# Patient Record
Sex: MALE | Race: WHITE | NOT HISPANIC OR LATINO | Employment: FULL TIME | URBAN - NONMETROPOLITAN AREA
[De-identification: names, ages, dates, MRNs, and addresses within clinical notes are randomized per-mention and may not be internally consistent; named-entity substitution may affect disease eponyms.]

---

## 2021-05-08 ENCOUNTER — APPOINTMENT (EMERGENCY)
Dept: RADIOLOGY | Facility: HOSPITAL | Age: 26
End: 2021-05-08
Payer: COMMERCIAL

## 2021-05-08 ENCOUNTER — HOSPITAL ENCOUNTER (EMERGENCY)
Facility: HOSPITAL | Age: 26
Discharge: HOME | End: 2021-05-08
Attending: EMERGENCY MEDICINE | Admitting: EMERGENCY MEDICINE
Payer: COMMERCIAL

## 2021-05-08 VITALS
SYSTOLIC BLOOD PRESSURE: 133 MMHG | WEIGHT: 292.55 LBS | RESPIRATION RATE: 20 BRPM | TEMPERATURE: 98.3 F | OXYGEN SATURATION: 98 % | DIASTOLIC BLOOD PRESSURE: 76 MMHG | HEART RATE: 76 BPM

## 2021-05-08 DIAGNOSIS — J45.901 EXACERBATION OF ASTHMA, UNSPECIFIED ASTHMA SEVERITY, UNSPECIFIED WHETHER PERSISTENT: Primary | ICD-10-CM

## 2021-05-08 LAB
ALBUMIN SERPL BCP-MCNC: 4.2 G/DL (ref 3.5–5)
ALP SERPL-CCNC: 60 U/L (ref 46–116)
ALT SERPL W P-5'-P-CCNC: 73 U/L (ref 12–78)
ANION GAP SERPL CALCULATED.3IONS-SCNC: 5 MMOL/L (ref 4–13)
AST SERPL W P-5'-P-CCNC: 30 U/L (ref 5–45)
BASOPHILS # BLD AUTO: 0.08 THOUSANDS/ΜL (ref 0–0.1)
BASOPHILS NFR BLD AUTO: 1 % (ref 0–1)
BILIRUB SERPL-MCNC: 0.86 MG/DL (ref 0.2–1)
BUN SERPL-MCNC: 13 MG/DL (ref 5–25)
CALCIUM SERPL-MCNC: 8.8 MG/DL (ref 8.3–10.1)
CHLORIDE SERPL-SCNC: 104 MMOL/L (ref 100–108)
CO2 SERPL-SCNC: 30 MMOL/L (ref 21–32)
CREAT SERPL-MCNC: 1.14 MG/DL (ref 0.6–1.3)
EOSINOPHIL # BLD AUTO: 0.66 THOUSAND/ΜL (ref 0–0.61)
EOSINOPHIL NFR BLD AUTO: 10 % (ref 0–6)
ERYTHROCYTE [DISTWIDTH] IN BLOOD BY AUTOMATED COUNT: 12.3 % (ref 11.6–15.1)
GFR SERPL CREATININE-BSD FRML MDRD: 88 ML/MIN/1.73SQ M
GLUCOSE SERPL-MCNC: 101 MG/DL (ref 65–140)
HCT VFR BLD AUTO: 45.6 % (ref 36.5–49.3)
HGB BLD-MCNC: 15.7 G/DL (ref 12–17)
IMM GRANULOCYTES # BLD AUTO: 0.02 THOUSAND/UL (ref 0–0.2)
IMM GRANULOCYTES NFR BLD AUTO: 0 % (ref 0–2)
LYMPHOCYTES # BLD AUTO: 2.65 THOUSANDS/ΜL (ref 0.6–4.47)
LYMPHOCYTES NFR BLD AUTO: 42 % (ref 14–44)
MCH RBC QN AUTO: 31 PG (ref 26.8–34.3)
MCHC RBC AUTO-ENTMCNC: 34.4 G/DL (ref 31.4–37.4)
MCV RBC AUTO: 90 FL (ref 82–98)
MONOCYTES # BLD AUTO: 0.35 THOUSAND/ΜL (ref 0.17–1.22)
MONOCYTES NFR BLD AUTO: 6 % (ref 4–12)
NEUTROPHILS # BLD AUTO: 2.62 THOUSANDS/ΜL (ref 1.85–7.62)
NEUTS SEG NFR BLD AUTO: 41 % (ref 43–75)
NRBC BLD AUTO-RTO: 0 /100 WBCS
PLATELET # BLD AUTO: 200 THOUSANDS/UL (ref 149–390)
PMV BLD AUTO: 9.9 FL (ref 8.9–12.7)
POTASSIUM SERPL-SCNC: 4.1 MMOL/L (ref 3.5–5.3)
PROT SERPL-MCNC: 7.6 G/DL (ref 6.4–8.2)
RBC # BLD AUTO: 5.07 MILLION/UL (ref 3.88–5.62)
SARS-COV-2 RNA RESP QL NAA+PROBE: NEGATIVE
SODIUM SERPL-SCNC: 139 MMOL/L (ref 136–145)
WBC # BLD AUTO: 6.38 THOUSAND/UL (ref 4.31–10.16)

## 2021-05-08 PROCEDURE — 71045 X-RAY EXAM CHEST 1 VIEW: CPT

## 2021-05-08 PROCEDURE — 96374 THER/PROPH/DIAG INJ IV PUSH: CPT

## 2021-05-08 PROCEDURE — 80053 COMPREHEN METABOLIC PANEL: CPT | Performed by: EMERGENCY MEDICINE

## 2021-05-08 PROCEDURE — U0003 INFECTIOUS AGENT DETECTION BY NUCLEIC ACID (DNA OR RNA); SEVERE ACUTE RESPIRATORY SYNDROME CORONAVIRUS 2 (SARS-COV-2) (CORONAVIRUS DISEASE [COVID-19]), AMPLIFIED PROBE TECHNIQUE, MAKING USE OF HIGH THROUGHPUT TECHNOLOGIES AS DESCRIBED BY CMS-2020-01-R: HCPCS | Performed by: EMERGENCY MEDICINE

## 2021-05-08 PROCEDURE — 94640 AIRWAY INHALATION TREATMENT: CPT

## 2021-05-08 PROCEDURE — 99285 EMERGENCY DEPT VISIT HI MDM: CPT

## 2021-05-08 PROCEDURE — 99285 EMERGENCY DEPT VISIT HI MDM: CPT | Performed by: EMERGENCY MEDICINE

## 2021-05-08 PROCEDURE — 85025 COMPLETE CBC W/AUTO DIFF WBC: CPT | Performed by: EMERGENCY MEDICINE

## 2021-05-08 PROCEDURE — 36415 COLL VENOUS BLD VENIPUNCTURE: CPT | Performed by: EMERGENCY MEDICINE

## 2021-05-08 PROCEDURE — U0005 INFEC AGEN DETEC AMPLI PROBE: HCPCS | Performed by: EMERGENCY MEDICINE

## 2021-05-08 RX ORDER — IPRATROPIUM BROMIDE AND ALBUTEROL SULFATE 2.5; .5 MG/3ML; MG/3ML
3 SOLUTION RESPIRATORY (INHALATION) ONCE
Status: COMPLETED | OUTPATIENT
Start: 2021-05-08 | End: 2021-05-08

## 2021-05-08 RX ORDER — ALBUTEROL SULFATE 90 UG/1
2 AEROSOL, METERED RESPIRATORY (INHALATION) ONCE
Status: COMPLETED | OUTPATIENT
Start: 2021-05-08 | End: 2021-05-08

## 2021-05-08 RX ORDER — METHYLPREDNISOLONE SODIUM SUCCINATE 125 MG/2ML
125 INJECTION, POWDER, LYOPHILIZED, FOR SOLUTION INTRAMUSCULAR; INTRAVENOUS ONCE
Status: COMPLETED | OUTPATIENT
Start: 2021-05-08 | End: 2021-05-08

## 2021-05-08 RX ADMIN — ALBUTEROL SULFATE 2 PUFF: 90 AEROSOL, METERED RESPIRATORY (INHALATION) at 02:43

## 2021-05-08 RX ADMIN — IPRATROPIUM BROMIDE AND ALBUTEROL SULFATE 3 ML: 2.5; .5 SOLUTION RESPIRATORY (INHALATION) at 01:23

## 2021-05-08 RX ADMIN — METHYLPREDNISOLONE SODIUM SUCCINATE 125 MG: 125 INJECTION, POWDER, FOR SOLUTION INTRAMUSCULAR; INTRAVENOUS at 01:23

## 2021-05-08 NOTE — ED PROVIDER NOTES
History  Chief Complaint   Patient presents with    Breathing Difficulty     patient reports difficulty breathing for 3 days  clear mucous with cough  Three days of coughing shortness of breath and wheezing  No fevers or chills  No chest pain  No nausea or vomiting  No body aches  No loss of taste or smell  No other COVID symptoms  No other complaints  Patient states he occasionally has similar symptoms to this in the [place] with asthmatic type symptoms such as wheezing cough      History provided by:  Patient   used: No    Cough  Cough characteristics:  Non-productive  Sputum characteristics:  Clear  Onset quality:  Gradual  Duration:  3 days  Timing:  Constant  Progression:  Unchanged  Chronicity:  New  Relieved by:  Nothing  Worsened by:  Nothing  Ineffective treatments:  None tried  Associated symptoms: shortness of breath and wheezing    Associated symptoms: no chest pain, no chills, no diaphoresis, no ear pain, no eye discharge, no fever, no headaches, no myalgias, no rash, no rhinorrhea, no sinus congestion and no sore throat        None       History reviewed  No pertinent past medical history  History reviewed  No pertinent surgical history  History reviewed  No pertinent family history  I have reviewed and agree with the history as documented  E-Cigarette/Vaping    E-Cigarette Use Never User      E-Cigarette/Vaping Substances    Nicotine No     THC No     CBD No     Flavoring No     Other No     Unknown No      Social History     Tobacco Use    Smoking status: Never Smoker    Smokeless tobacco: Never Used   Substance Use Topics    Alcohol use: Yes     Frequency: 2-3 times a week    Drug use: Not Currently       Review of Systems   Constitutional: Negative for chills, diaphoresis and fever  HENT: Negative for ear pain, hearing loss, rhinorrhea, sore throat, trouble swallowing and voice change  Eyes: Negative for pain and discharge     Respiratory: Positive for cough, shortness of breath and wheezing  Cardiovascular: Negative for chest pain and palpitations  Gastrointestinal: Negative for abdominal pain, blood in stool, constipation, diarrhea, nausea and vomiting  Genitourinary: Negative for dysuria, flank pain, frequency and hematuria  Musculoskeletal: Negative for joint swelling, myalgias, neck pain and neck stiffness  Skin: Negative for rash and wound  Neurological: Negative for dizziness, seizures, syncope, facial asymmetry and headaches  Psychiatric/Behavioral: Negative for hallucinations, self-injury and suicidal ideas  All other systems reviewed and are negative  Physical Exam  Physical Exam  Vitals signs and nursing note reviewed  Constitutional:       General: He is not in acute distress  Appearance: He is well-developed  HENT:      Head: Normocephalic and atraumatic  Right Ear: External ear normal       Left Ear: External ear normal    Eyes:      Conjunctiva/sclera: Conjunctivae normal       Pupils: Pupils are equal, round, and reactive to light  Neck:      Musculoskeletal: Normal range of motion and neck supple  Cardiovascular:      Rate and Rhythm: Normal rate and regular rhythm  Heart sounds: Normal heart sounds  No murmur  Pulmonary:      Effort: Pulmonary effort is normal       Breath sounds: Wheezing (End-expiratory) present  No rales  Abdominal:      General: Bowel sounds are normal  There is no distension  Palpations: Abdomen is soft  Tenderness: There is no abdominal tenderness  There is no guarding or rebound  Musculoskeletal: Normal range of motion  General: No deformity  Skin:     General: Skin is warm and dry  Findings: No rash  Neurological:      General: No focal deficit present  Mental Status: He is alert and oriented to person, place, and time  Cranial Nerves: No cranial nerve deficit     Psychiatric:         Behavior: Behavior normal          Vital Signs  ED Triage Vitals [05/08/21 0115]   Temperature Pulse Respirations Blood Pressure SpO2   97 9 °F (36 6 °C) 69 19 148/95 96 %      Temp Source Heart Rate Source Patient Position - Orthostatic VS BP Location FiO2 (%)   Temporal -- Lying Left arm --      Pain Score       No Pain           Vitals:    05/08/21 0115   BP: 148/95   Pulse: 69   Patient Position - Orthostatic VS: Lying         Visual Acuity      ED Medications  Medications   albuterol (PROVENTIL HFA,VENTOLIN HFA) inhaler 2 puff (has no administration in time range)   ipratropium-albuterol (DUO-NEB) 0 5-2 5 mg/3 mL inhalation solution 3 mL (3 mL Nebulization Given 5/8/21 0123)   methylPREDNISolone sodium succinate (Solu-MEDROL) injection 125 mg (125 mg Intravenous Given 5/8/21 0123)       Diagnostic Studies  Results Reviewed     Procedure Component Value Units Date/Time    Novel Coronavirus [facility] [[id number]]  (Normal) Collected: 05/08/21 0123    Lab Status: Final result Specimen: Nares from Nasopharyngeal Swab Updated: 05/08/21 0234     SARS-CoV-2 Negative    Narrative: The specimen collection materials, transport medium, and/or testing methodology utilized in the production of these test results have been proven to be reliable in a limited validation with an abbreviated program under the Emergency Utilization Authorization provided by the FDA  Testing reported as "Presumptive positive" will be confirmed with secondary testing to ensure result accuracy  Clinical caution and judgement should be used with the interpretation of these results with consideration of the clinical impression and other laboratory testing  Testing reported as "Positive" or "Negative" has been proven to be accurate according to standard laboratory validation requirements  All testing is performed with control materials showing appropriate reactivity at standard intervals        Comprehensive metabolic panel [[id number]] Collected: 05/08/21 0122    Lab Status: Final result Specimen: Blood from Arm, Right Updated: 05/08/21 0154     Sodium 139 mmol/L      Potassium 4 1 mmol/L      Chloride 104 mmol/L      CO2 30 mmol/L      ANION GAP 5 mmol/L      BUN 13 mg/dL      Creatinine 1 14 mg/dL      Glucose 101 mg/dL      Calcium 8 8 mg/dL      AST 30 U/L      ALT 73 U/L      Alkaline Phosphatase 60 U/L      Total Protein 7 6 g/dL      Albumin 4 2 g/dL      Total Bilirubin 0 86 mg/dL      eGFR 88 ml/min/1 73sq m     Narrative:      [place] guidelines for Chronic Kidney Disease (CKD):     Stage 1 with normal or high GFR (GFR > 90 mL/min/1 73 square meters)    Stage 2 Mild CKD (GFR = 60-89 mL/min/1 73 square meters)    Stage 3A Moderate CKD (GFR = 45-59 mL/min/1 73 square meters)    Stage 3B Moderate CKD (GFR = 30-44 mL/min/1 73 square meters)    Stage 4 Severe CKD (GFR = 15-29 mL/min/1 73 square meters)    Stage 5 End Stage CKD (GFR <15 mL/min/1 73 square meters)  Note: GFR calculation is accurate only with a steady state creatinine    CBC and differential [[id number]]  (Abnormal) Collected: 05/08/21 0122    Lab Status: Final result Specimen: Blood from Arm, Right Updated: 05/08/21 0134     WBC 6 38 Thousand/uL      RBC 5 07 Million/uL      Hemoglobin 15 7 g/dL      Hematocrit 45 6 %      MCV 90 fL      MCH 31 0 pg      MCHC 34 4 g/dL      RDW 12 3 %      MPV 9 9 fL      Platelets 286 Thousands/uL      nRBC 0 /100 WBCs      Neutrophils Relative 41 %      Immat GRANS % 0 %      Lymphocytes Relative 42 %      Monocytes Relative 6 %      Eosinophils Relative 10 %      Basophils Relative 1 %      Neutrophils Absolute 2 62 Thousands/µL      Immature Grans Absolute 0 02 Thousand/uL      Lymphocytes Absolute 2 65 Thousands/µL      Monocytes Absolute 0 35 Thousand/µL      Eosinophils Absolute 0 66 Thousand/µL      Basophils Absolute 0 08 Thousands/µL                  XR chest portable   ED Interpretation by Deng Box MD (05/08 0908)   Normal chest Procedures  Procedures         ED Course  ED Course as of May 08 0241   Sat May 08, 2021   1254 COVID testing negative, chest x-ray negative, patient improved after nebulization, appropriate for discharge  8609 Patient reexamined and wheezing resolved  [facility]  Number of Diagnoses or Management Options     Amount and/or Complexity of Data Reviewed  Clinical lab tests: ordered and reviewed  Tests in the radiology section of CPT®: ordered and reviewed  Decide to obtain previous medical records or to obtain history from someone other than the patient: yes  Review and summarize past medical records: yes  Independent visualization of images, tracings, or specimens: yes        Disposition  Final diagnoses:   Exacerbation of asthma, unspecified asthma severity, unspecified whether persistent     Time reflects when diagnosis was documented in both MDM as applicable and the Disposition within this note     Time User Action Codes Description Comment    5/8/2021  2:41 AM Hartman December Add [J45 [id number]] Exacerbation of asthma, unspecified asthma severity, unspecified whether persistent       ED Disposition     ED Disposition Condition Date/Time Comment    Discharge Stable Sat May 8, 2021  2:40 AM Kerry Pool discharge to home/self care  Follow-up Information     Follow up With Specialties Details Why [facility], DO Family Medicine  As needed [address]  [address]  [address] [facility] [address]  [phone]            Patient's Medications    No medications on file     No discharge procedures on file      PDMP Review     None          ED Provider  Electronically Signed by           Tracy Staples MD  05/08/21 9265

## 2021-05-17 ENCOUNTER — OFFICE VISIT (OUTPATIENT)
Dept: FAMILY MEDICINE CLINIC | Facility: CLINIC | Age: 26
End: 2021-05-17
Payer: COMMERCIAL

## 2021-05-17 VITALS
DIASTOLIC BLOOD PRESSURE: 84 MMHG | HEART RATE: 68 BPM | WEIGHT: 291 LBS | RESPIRATION RATE: 20 BRPM | TEMPERATURE: 97.5 F | BODY MASS INDEX: 36.18 KG/M2 | SYSTOLIC BLOOD PRESSURE: 154 MMHG | HEIGHT: 75 IN

## 2021-05-17 DIAGNOSIS — E55.9 VITAMIN D DEFICIENCY: ICD-10-CM

## 2021-05-17 DIAGNOSIS — J45.909 MILD ASTHMA WITHOUT COMPLICATION, UNSPECIFIED WHETHER PERSISTENT: Primary | ICD-10-CM

## 2021-05-17 DIAGNOSIS — E66.01 CLASS 2 SEVERE OBESITY DUE TO EXCESS CALORIES WITH SERIOUS COMORBIDITY AND BODY MASS INDEX (BMI) OF 36.0 TO 36.9 IN ADULT (HCC): ICD-10-CM

## 2021-05-17 PROCEDURE — 99203 OFFICE O/P NEW LOW 30 MIN: CPT | Performed by: FAMILY MEDICINE

## 2021-05-17 PROCEDURE — 1036F TOBACCO NON-USER: CPT | Performed by: FAMILY MEDICINE

## 2021-05-17 PROCEDURE — 3008F BODY MASS INDEX DOCD: CPT | Performed by: FAMILY MEDICINE

## 2021-05-17 PROCEDURE — 3725F SCREEN DEPRESSION PERFORMED: CPT | Performed by: FAMILY MEDICINE

## 2021-05-17 RX ORDER — METHYLPREDNISOLONE 4 MG/1
TABLET ORAL
Qty: 21 EACH | Refills: 0 | Status: SHIPPED | OUTPATIENT
Start: 2021-05-17

## 2021-05-17 RX ORDER — ALBUTEROL SULFATE 90 UG/1
2 AEROSOL, METERED RESPIRATORY (INHALATION) EVERY 6 HOURS PRN
COMMUNITY
End: 2021-05-17 | Stop reason: SDUPTHER

## 2021-05-17 RX ORDER — AZITHROMYCIN 250 MG/1
TABLET, FILM COATED ORAL
Qty: 6 TABLET | Refills: 0 | Status: SHIPPED | OUTPATIENT
Start: 2021-05-17 | End: 2021-05-22

## 2021-05-17 RX ORDER — DEXTROMETHORPHAN HYDROBROMIDE AND PROMETHAZINE HYDROCHLORIDE 15; 6.25 MG/5ML; MG/5ML
5 SOLUTION ORAL 4 TIMES DAILY PRN
Qty: 180 ML | Refills: 1 | Status: SHIPPED | OUTPATIENT
Start: 2021-05-17

## 2021-05-17 RX ORDER — ALBUTEROL SULFATE 90 UG/1
2 AEROSOL, METERED RESPIRATORY (INHALATION) EVERY 6 HOURS PRN
Qty: 18 G | Refills: 5 | Status: SHIPPED | OUTPATIENT
Start: 2021-05-17

## 2021-05-17 NOTE — PROGRESS NOTES
Assessment/Plan: mild persistent asthma of  New onset  Currently on albuterol and Mucinex with continued symptoms  The plan is to do a pulmonary function test   [facility] [place] allergy panel and mycoplasma titer  We will renew the Proventil inhaler add Medrol Z-J Luis and promethazine DM  This is the 1st asthmatic attack the patient has had and he has never smoked  Hypertension with a blood pressure 154/84 will observe the blood pressure  Will be obtaining baseline laboratory and recheck the patient after lower laboratory and testing is completed    Class 2 obesity diet has been discussed    Problem List Items Addressed This Visit     None      Visit Diagnoses     Mild asthma without complication, unspecified whether persistent    -  Primary    Relevant Medications    methylPREDNISolone 4 MG tablet therapy pack    azithromycin (Zithromax) 250 mg tablet    Promethazine-DM (PHENERGAN-DM) 6 25-15 mg/5 mL oral syrup    albuterol (PROVENTIL HFA,VENTOLIN HFA) 90 mcg/act inhaler    Other Relevant Orders    Complete PFT with post bronchodilator    [facility] Allergy Panel, Adult    Mycoplasma Pneumoniae AB, IgG/IgM    CBC and differential    Comprehensive metabolic panel           Diagnoses and all orders for this visit:    Mild asthma without complication, unspecified whether persistent  -     Complete PFT with post bronchodilator; Future  -     [facility] Allergy Panel, Adult; Future  -     methylPREDNISolone 4 MG tablet therapy pack; Use as directed on package  -     azithromycin (Zithromax) 250 mg tablet; Take 2 tablets (500 mg total) by mouth daily for 1 day, THEN 1 tablet (250 mg total) daily for 4 days  -     Mycoplasma Pneumoniae AB, IgG/IgM; Future  -     CBC and differential; Future  -     Comprehensive metabolic panel; Future  -     Promethazine-DM (PHENERGAN-DM) 6 25-15 mg/5 mL oral syrup;  Take 5 mL by mouth 4 (four) times a day as needed for cough  -     albuterol (PROVENTIL HFA,VENTOLIN HFA) 90 mcg/act inhaler; Inhale 2 puffs every 6 (six) hours as needed for wheezing    Other orders  -     Discontinue: albuterol (PROVENTIL HFA,VENTOLIN HFA) 90 mcg/act inhaler; Inhale 2 puffs every 6 (six) hours as needed for wheezing        No problem-specific Assessment & Plan notes found for this encounter  PHQ-9 Depression Screening    PHQ-9:   Frequency of the following problems over the past two weeks:      Little interest or pleasure in doing things: 0 - not at all  Feeling down, depressed, or hopeless: 0 - not at all  PHQ-2 Score: 0          Body mass index is 36 37 kg/m²  BMI Counseling: Body mass index is 36 37 kg/m²  The BMI is above normal  Nutrition recommendations include reducing portion sizes, decreasing overall calorie intake and 3-5 servings of fruits/vegetables daily  Subjective:      Patient ID: Mayra Weiss is a 31 y o  male  Patient presents after being seen at the emergency department may 8 with an asthmatic attack the patient was treated with steroids and given albuterol inhaler   Patient presents because he is still symptomatic      The following portions of the patient's history were reviewed and updated as appropriate:   He has no past medical history on file  ,  does not have a problem list on file  ,   has no past surgical history on file  ,  family history is not on file  ,   reports that he has never smoked  He has never used smokeless tobacco  He reports current alcohol use  He reports previous drug use ,  has No Known Allergies     Current Outpatient Medications   Medication Sig Dispense Refill    albuterol (PROVENTIL HFA,VENTOLIN HFA) 90 mcg/act inhaler Inhale 2 puffs every 6 (six) hours as needed for wheezing 18 g 5    azithromycin (Zithromax) 250 mg tablet Take 2 tablets (500 mg total) by mouth daily for 1 day, THEN 1 tablet (250 mg total) daily for 4 days   6 tablet 0    methylPREDNISolone 4 MG tablet therapy pack Use as directed on package 21 each 0    Promethazine-DM (PHENERGAN-DM) 6 25-15 mg/5 mL oral syrup Take 5 mL by mouth 4 (four) times a day as needed for cough 180 mL 1     No current facility-administered medications for this visit  Review of Systems   Constitutional: Negative for chills and fever  HENT: Positive for rhinorrhea and sneezing  Negative for ear pain and sore throat  Eyes: Negative for pain and visual disturbance  Respiratory: Positive for cough and wheezing  Negative for shortness of breath  Cardiovascular: Negative for chest pain and palpitations  Gastrointestinal: Negative for abdominal pain and vomiting  Genitourinary: Negative for dysuria and hematuria  Musculoskeletal: Negative for arthralgias and back pain  Skin: Negative for color change and rash  Neurological: Negative for seizures and syncope  All other systems reviewed and are negative  Objective:    /84   Pulse 68   Temp 97 5 °F (36 4 °C)   Resp 20   Ht 6' 3" (1 905 m)   Wt 132 kg (291 lb)   BMI 36 37 kg/m²   Body mass index is 36 37 kg/m²  Physical Exam  Constitutional:       Appearance: He is well-developed  HENT:      Head: Normocephalic  Eyes:      Pupils: Pupils are equal, round, and reactive to light  Neck:      Musculoskeletal: Normal range of motion  Cardiovascular:      Rate and Rhythm: Normal rate and regular rhythm  Heart sounds: Normal heart sounds  Pulmonary:      Effort: Pulmonary effort is normal       Breath sounds: Normal breath sounds  Abdominal:      General: Bowel sounds are normal       Palpations: Abdomen is soft  Tenderness: There is no abdominal tenderness  Skin:     General: Skin is warm  Neurological:      Mental Status: He is alert and oriented to person, place, and time

## 2021-06-18 ENCOUNTER — HOSPITAL ENCOUNTER (OUTPATIENT)
Dept: PULMONOLOGY | Facility: HOSPITAL | Age: 26
Discharge: HOME | End: 2021-06-18
Attending: FAMILY MEDICINE
Payer: COMMERCIAL

## 2021-06-18 DIAGNOSIS — J45.909 MILD ASTHMA WITHOUT COMPLICATION, UNSPECIFIED WHETHER PERSISTENT: ICD-10-CM

## 2021-06-18 PROCEDURE — 94060 EVALUATION OF WHEEZING: CPT | Performed by: INTERNAL MEDICINE

## 2021-06-18 PROCEDURE — 94729 DIFFUSING CAPACITY: CPT | Performed by: INTERNAL MEDICINE

## 2021-06-18 PROCEDURE — 94726 PLETHYSMOGRAPHY LUNG VOLUMES: CPT

## 2021-06-18 PROCEDURE — 94726 PLETHYSMOGRAPHY LUNG VOLUMES: CPT | Performed by: INTERNAL MEDICINE

## 2021-06-18 PROCEDURE — 94729 DIFFUSING CAPACITY: CPT

## 2021-06-18 PROCEDURE — 94760 N-INVAS EAR/PLS OXIMETRY 1: CPT

## 2021-06-18 PROCEDURE — 94060 EVALUATION OF WHEEZING: CPT

## 2021-06-18 RX ORDER — ALBUTEROL SULFATE 2.5 MG/3ML
2.5 SOLUTION RESPIRATORY (INHALATION) ONCE AS NEEDED
Status: COMPLETED | OUTPATIENT
Start: 2021-06-18 | End: 2021-06-18

## 2021-06-18 RX ADMIN — ALBUTEROL SULFATE 2.5 MG: 2.5 SOLUTION RESPIRATORY (INHALATION) at 16:27

## 2021-10-10 ENCOUNTER — HOSPITAL ENCOUNTER (EMERGENCY)
Facility: HOSPITAL | Age: 26
Discharge: HOME | End: 2021-10-10
Attending: EMERGENCY MEDICINE | Admitting: EMERGENCY MEDICINE
Payer: COMMERCIAL

## 2021-10-10 ENCOUNTER — APPOINTMENT (EMERGENCY)
Dept: RADIOLOGY | Facility: HOSPITAL | Age: 26
End: 2021-10-10
Payer: COMMERCIAL

## 2021-10-10 VITALS
RESPIRATION RATE: 20 BRPM | BODY MASS INDEX: 34.51 KG/M2 | OXYGEN SATURATION: 99 % | HEIGHT: 77 IN | HEART RATE: 108 BPM | DIASTOLIC BLOOD PRESSURE: 81 MMHG | TEMPERATURE: 98.2 F | SYSTOLIC BLOOD PRESSURE: 138 MMHG

## 2021-10-10 DIAGNOSIS — T14.8XXA PUNCTURE WOUND: ICD-10-CM

## 2021-10-10 DIAGNOSIS — M25.571 ACUTE RIGHT ANKLE PAIN: Primary | ICD-10-CM

## 2021-10-10 PROCEDURE — 90715 TDAP VACCINE 7 YRS/> IM: CPT | Performed by: PHYSICIAN ASSISTANT

## 2021-10-10 PROCEDURE — 90471 IMMUNIZATION ADMIN: CPT

## 2021-10-10 PROCEDURE — 73610 X-RAY EXAM OF ANKLE: CPT

## 2021-10-10 PROCEDURE — 99283 EMERGENCY DEPT VISIT LOW MDM: CPT

## 2021-10-10 PROCEDURE — 99284 EMERGENCY DEPT VISIT MOD MDM: CPT | Performed by: PHYSICIAN ASSISTANT

## 2021-10-10 RX ORDER — CEPHALEXIN 250 MG/1
500 CAPSULE ORAL ONCE
Status: COMPLETED | OUTPATIENT
Start: 2021-10-10 | End: 2021-10-10

## 2021-10-10 RX ORDER — NAPROXEN 500 MG/1
500 TABLET ORAL 2 TIMES DAILY WITH MEALS
Qty: 14 TABLET | Refills: 0 | Status: SHIPPED | OUTPATIENT
Start: 2021-10-10 | End: 2021-10-17

## 2021-10-10 RX ORDER — CEPHALEXIN 500 MG/1
500 CAPSULE ORAL EVERY 6 HOURS SCHEDULED
Qty: 28 CAPSULE | Refills: 0 | Status: SHIPPED | OUTPATIENT
Start: 2021-10-10 | End: 2021-10-17

## 2021-10-10 RX ADMIN — CEPHALEXIN 500 MG: 250 CAPSULE ORAL at 17:14

## 2021-10-10 RX ADMIN — TETANUS TOXOID, REDUCED DIPHTHERIA TOXOID AND ACELLULAR PERTUSSIS VACCINE, ADSORBED 0.5 ML: 5; 2.5; 8; 8; 2.5 SUSPENSION INTRAMUSCULAR at 16:41

## 2024-07-21 ENCOUNTER — HOSPITAL ENCOUNTER (EMERGENCY)
Facility: HOSPITAL | Age: 29
Discharge: HOME | End: 2024-07-21
Attending: EMERGENCY MEDICINE
Payer: COMMERCIAL

## 2024-07-21 ENCOUNTER — APPOINTMENT (EMERGENCY)
Dept: RADIOLOGY | Facility: HOSPITAL | Age: 29
End: 2024-07-21
Payer: COMMERCIAL

## 2024-07-21 VITALS
HEART RATE: 67 BPM | WEIGHT: 306.44 LBS | OXYGEN SATURATION: 95 % | RESPIRATION RATE: 17 BRPM | HEIGHT: 77 IN | TEMPERATURE: 97.5 F | BODY MASS INDEX: 36.18 KG/M2 | SYSTOLIC BLOOD PRESSURE: 128 MMHG | DIASTOLIC BLOOD PRESSURE: 72 MMHG

## 2024-07-21 DIAGNOSIS — J45.909 MILD ASTHMA WITHOUT COMPLICATION: ICD-10-CM

## 2024-07-21 DIAGNOSIS — J45.909 MILD ASTHMA WITHOUT COMPLICATION, UNSPECIFIED WHETHER PERSISTENT: ICD-10-CM

## 2024-07-21 DIAGNOSIS — R05.9 COUGH: Primary | ICD-10-CM

## 2024-07-21 PROCEDURE — 99284 EMERGENCY DEPT VISIT MOD MDM: CPT | Performed by: EMERGENCY MEDICINE

## 2024-07-21 PROCEDURE — 99285 EMERGENCY DEPT VISIT HI MDM: CPT

## 2024-07-21 PROCEDURE — 71046 X-RAY EXAM CHEST 2 VIEWS: CPT

## 2024-07-21 RX ORDER — DIPHENHYDRAMINE HCL 25 MG
50 TABLET ORAL ONCE
Status: COMPLETED | OUTPATIENT
Start: 2024-07-21 | End: 2024-07-21

## 2024-07-21 RX ORDER — PREDNISONE 20 MG/1
40 TABLET ORAL DAILY
Qty: 8 TABLET | Refills: 0 | Status: SHIPPED | OUTPATIENT
Start: 2024-07-21 | End: 2024-07-25

## 2024-07-21 RX ORDER — PREDNISONE 20 MG/1
60 TABLET ORAL ONCE
Status: COMPLETED | OUTPATIENT
Start: 2024-07-21 | End: 2024-07-21

## 2024-07-21 RX ORDER — ALBUTEROL SULFATE 90 UG/1
2 AEROSOL, METERED RESPIRATORY (INHALATION) EVERY 6 HOURS PRN
Qty: 18 G | Refills: 0 | Status: SHIPPED | OUTPATIENT
Start: 2024-07-21

## 2024-07-21 RX ORDER — DEXTROMETHORPHAN HYDROBROMIDE AND PROMETHAZINE HYDROCHLORIDE 15; 6.25 MG/5ML; MG/5ML
5 SYRUP ORAL 4 TIMES DAILY PRN
Qty: 100 ML | Refills: 0 | Status: SHIPPED | OUTPATIENT
Start: 2024-07-21 | End: 2024-07-28

## 2024-07-21 RX ADMIN — PREDNISONE 60 MG: 20 TABLET ORAL at 04:39

## 2024-07-21 RX ADMIN — DIPHENHYDRAMINE HYDROCHLORIDE 50 MG: 25 TABLET ORAL at 04:39

## 2024-07-21 NOTE — ED PROVIDER NOTES
"History  Chief Complaint   Patient presents with    Shortness of Breath     Woke from sleep \" wheezing\", took mucinex and now feels that his throat is itchy      This is a 28-year-old male presenting to the ED for evaluation of cough and wheezing.  Patient states that he was experiencing an itchy throat.  Patient denies any recent illness or travel.  He states that he used his inhaler about 8 times prior to coming to the ED.  Patient also admits to taking double the amount of Mucinex because he was having a difficult time breathing.  Denied any chest pain.          Prior to Admission Medications   Prescriptions Last Dose Informant Patient Reported? Taking?   Promethazine-DM (PHENERGAN-DM) 6.25-15 mg/5 mL oral syrup   No No   Sig: Take 5 mL by mouth 4 (four) times a day as needed for cough   Patient not taking: Reported on 10/10/2021   albuterol (PROVENTIL HFA,VENTOLIN HFA) 90 mcg/act inhaler   No No   Sig: Inhale 2 puffs every 6 (six) hours as needed for wheezing   albuterol (PROVENTIL HFA,VENTOLIN HFA) 90 mcg/act inhaler   No Yes   Sig: Inhale 2 puffs every 6 (six) hours as needed for wheezing   methylPREDNISolone 4 MG tablet therapy pack   No No   Sig: Use as directed on package   Patient not taking: Reported on 10/10/2021   naproxen (NAPROSYN) 500 mg tablet   No No   Sig: Take 1 tablet (500 mg total) by mouth 2 (two) times a day with meals for 7 days   promethazine-dextromethorphan (PHENERGAN-DM) 6.25-15 mg/5 mL oral syrup   No Yes   Sig: Take 5 mL by mouth 4 (four) times a day as needed for cough for up to 7 days      Facility-Administered Medications: None       History reviewed. No pertinent past medical history.    History reviewed. No pertinent surgical history.    History reviewed. No pertinent family history.  I have reviewed and agree with the history as documented.    E-Cigarette/Vaping    E-Cigarette Use Never User      E-Cigarette/Vaping Substances    Nicotine No     THC No     CBD No     Flavoring No  "    Other No     Unknown No      Social History     Tobacco Use    Smoking status: Never    Smokeless tobacco: Never   Vaping Use    Vaping status: Never Used   Substance Use Topics    Alcohol use: Yes    Drug use: Not Currently       Review of Systems   Constitutional:  Negative for chills and fever.   HENT:  Positive for trouble swallowing. Negative for ear pain and sore throat.    Eyes:  Negative for pain and visual disturbance.   Respiratory:  Positive for cough, shortness of breath and wheezing.    Cardiovascular:  Negative for chest pain and palpitations.   Gastrointestinal:  Negative for abdominal pain and vomiting.   Genitourinary:  Negative for dysuria and hematuria.   Musculoskeletal:  Negative for arthralgias and back pain.   Skin:  Negative for color change and rash.   Neurological:  Negative for seizures and syncope.   All other systems reviewed and are negative.      Physical Exam  Physical Exam  Vitals and nursing note reviewed.   Constitutional:       General: He is not in acute distress.     Appearance: He is well-developed. He is obese.   HENT:      Head: Normocephalic and atraumatic.      Mouth/Throat:      Mouth: Mucous membranes are moist.      Pharynx: Oropharynx is clear.   Eyes:      Extraocular Movements: Extraocular movements intact.      Conjunctiva/sclera: Conjunctivae normal.   Cardiovascular:      Rate and Rhythm: Normal rate and regular rhythm.      Heart sounds: No murmur heard.  Pulmonary:      Effort: Pulmonary effort is normal. No respiratory distress.      Breath sounds: Normal breath sounds. No decreased breath sounds, wheezing or rhonchi.   Chest:      Chest wall: No mass, deformity, tenderness, crepitus or edema. There is no dullness to percussion.   Abdominal:      General: Bowel sounds are normal.      Palpations: Abdomen is soft.      Tenderness: There is no abdominal tenderness.   Musculoskeletal:         General: No swelling. Normal range of motion.      Cervical back:  Normal range of motion and neck supple.      Right lower leg: No tenderness. No edema.      Left lower leg: No tenderness. No edema.   Skin:     General: Skin is warm and dry.      Capillary Refill: Capillary refill takes less than 2 seconds.   Neurological:      General: No focal deficit present.      Mental Status: He is alert and oriented to person, place, and time.   Psychiatric:         Mood and Affect: Mood normal.         Behavior: Behavior normal.         Vital Signs  ED Triage Vitals [07/21/24 0402]   Temperature Pulse Respirations Blood Pressure SpO2   97.5 °F (36.4 °C) 84 16 160/96 95 %      Temp Source Heart Rate Source Patient Position - Orthostatic VS BP Location FiO2 (%)   Temporal Monitor -- Left arm --      Pain Score       No Pain           Vitals:    07/21/24 0402 07/21/24 0430   BP: 160/96 128/72   Pulse: 84 67         Visual Acuity      ED Medications  Medications   predniSONE tablet 60 mg (60 mg Oral Given 7/21/24 0439)   diphenhydrAMINE (BENADRYL) tablet 50 mg (50 mg Oral Given 7/21/24 0439)       Diagnostic Studies  Results Reviewed       None                   XR chest 2 views    (Results Pending)              Procedures  Procedures         ED Course  ED Course as of 07/21/24 0526   Sun Jul 21, 2024   0507 Patient has no acute findings on his chest x-ray.  He is not febrile nor does he have any hypoxia.  Symptoms seem more consistent with asthma.  He will be discharged home and instructed to return to the ED for any worsening symptoms.                                 SBIRT 22yo+      Flowsheet Row Most Recent Value   Initial Alcohol Screen: US AUDIT-C     1. How often do you have a drink containing alcohol? 0 Filed at: 07/21/2024 0402   2. How many drinks containing alcohol do you have on a typical day you are drinking?  0 Filed at: 07/21/2024 0402   3a. Male UNDER 65: How often do you have five or more drinks on one occasion? 0 Filed at: 07/21/2024 0402   3b. FEMALE Any Age, or MALE 65+:  How often do you have 4 or more drinks on one occassion? 0 Filed at: 07/21/2024 0402   Audit-C Score 0 Filed at: 07/21/2024 0402   JASON: How many times in the past year have you...    Used an illegal drug or used a prescription medication for non-medical reasons? Never Filed at: 07/21/2024 0402                      Medical Decision Making  Differential diagnosis includes but not limited to: Allergic reaction, asthma exacerbation, viral cough/viral syndrome      Amount and/or Complexity of Data Reviewed  Radiology: ordered.    Risk  OTC drugs.  Prescription drug management.                 Disposition  Final diagnoses:   Cough   Mild asthma without complication     Time reflects when diagnosis was documented in both MDM as applicable and the Disposition within this note       Time User Action Codes Description Comment    7/21/2024  5:01 AM Vicky Landeros [R05.9] Cough     7/21/2024  5:01 AM Vicky Landeros [J45.909] Mild asthma without complication, unspecified whether persistent     7/21/2024  5:03 AM Vicky Landeros [J45.909] Mild asthma without complication           ED Disposition       ED Disposition   Discharge    Condition   Stable    Date/Time   Sun Jul 21, 2024 0501    Comment   Smith Bravo discharge to home/self care.                   Follow-up Information       Follow up With Specialties Details Why Contact Info    your pcp                Patient's Medications   Discharge Prescriptions    PREDNISONE 20 MG TABLET    Take 2 tablets (40 mg total) by mouth daily for 4 days       Start Date: 7/21/2024 End Date: 7/25/2024       Order Dose: 40 mg       Quantity: 8 tablet    Refills: 0       No discharge procedures on file.    PDMP Review       None            ED Provider  Electronically Signed by             Vicky Landeros DO  07/21/24 0579

## 2024-08-12 ENCOUNTER — OFFICE VISIT (OUTPATIENT)
Dept: FAMILY MEDICINE CLINIC | Facility: CLINIC | Age: 29
End: 2024-08-12
Payer: COMMERCIAL

## 2024-08-12 VITALS
BODY MASS INDEX: 34.67 KG/M2 | TEMPERATURE: 98.6 F | DIASTOLIC BLOOD PRESSURE: 80 MMHG | SYSTOLIC BLOOD PRESSURE: 126 MMHG | HEART RATE: 86 BPM | WEIGHT: 293.6 LBS | HEIGHT: 77 IN | OXYGEN SATURATION: 96 %

## 2024-08-12 DIAGNOSIS — J45.909 MILD ASTHMA WITHOUT COMPLICATION, UNSPECIFIED WHETHER PERSISTENT: ICD-10-CM

## 2024-08-12 DIAGNOSIS — J40 BRONCHITIS: Primary | ICD-10-CM

## 2024-08-12 PROCEDURE — 99204 OFFICE O/P NEW MOD 45 MIN: CPT | Performed by: FAMILY MEDICINE

## 2024-08-12 RX ORDER — AZITHROMYCIN 250 MG/1
TABLET, FILM COATED ORAL
Qty: 6 TABLET | Refills: 0 | Status: SHIPPED | OUTPATIENT
Start: 2024-08-12 | End: 2024-08-17

## 2024-08-12 RX ORDER — MOMETASONE FUROATE 100 UG/1
2 AEROSOL RESPIRATORY (INHALATION) 2 TIMES DAILY
Qty: 13 G | Refills: 3 | Status: SHIPPED | OUTPATIENT
Start: 2024-08-12

## 2024-08-12 RX ORDER — PREDNISONE 20 MG/1
40 TABLET ORAL DAILY
Qty: 10 TABLET | Refills: 0 | Status: SHIPPED | OUTPATIENT
Start: 2024-08-12 | End: 2024-08-17

## 2024-08-12 RX ORDER — BENZONATATE 200 MG/1
200 CAPSULE ORAL 3 TIMES DAILY PRN
Qty: 30 CAPSULE | Refills: 0 | Status: SHIPPED | OUTPATIENT
Start: 2024-08-12

## 2024-08-12 RX ORDER — ALBUTEROL SULFATE 90 UG/1
2 AEROSOL, METERED RESPIRATORY (INHALATION) EVERY 6 HOURS PRN
Qty: 18 G | Refills: 0 | Status: SHIPPED | OUTPATIENT
Start: 2024-08-12

## 2024-08-12 NOTE — PROGRESS NOTES
Assessment/Plan: July 21 emergency room notes reviewed and appreciated, chest x-ray reviewed.  Pulmonary function study from 2021 reviewed as normal.  I would suspect a mycoplasma infection we will provide Zithromax Z-J Luis prednisone 40 mg daily.  Will provide Asmanex 1 puff twice daily the patient will use albuterol every 4 hours as needed.  Will provide Tessalon Perles 200 mg 3 times daily for cough suppression.  The patient will obtain a regional panel for allergies.  The patient will obtain a mycoplasma panel.    Problem List Items Addressed This Visit       Mild asthma without complication    Relevant Medications    albuterol (PROVENTIL HFA,VENTOLIN HFA) 90 mcg/act inhaler    Mometasone Furoate (Asmanex HFA) 100 MCG/ACT AERO    predniSONE 20 mg tablet    Other Relevant Orders    Regional Panel 1     Other Visit Diagnoses       Bronchitis    -  Primary    Relevant Medications    albuterol (PROVENTIL HFA,VENTOLIN HFA) 90 mcg/act inhaler    Mometasone Furoate (Asmanex HFA) 100 MCG/ACT AERO    azithromycin (Zithromax) 250 mg tablet    benzonatate (TESSALON) 200 MG capsule    Other Relevant Orders    CBC and differential    Mycoplasma Pneumoniae AB, IgG/IgM             Diagnoses and all orders for this visit:    Bronchitis  -     azithromycin (Zithromax) 250 mg tablet; Take 2 tablets (500 mg total) by mouth daily for 1 day, THEN 1 tablet (250 mg total) daily for 4 days.  -     benzonatate (TESSALON) 200 MG capsule; Take 1 capsule (200 mg total) by mouth 3 (three) times a day as needed for cough  -     CBC and differential; Future  -     Mycoplasma Pneumoniae AB, IgG/IgM; Future    Mild asthma without complication, unspecified whether persistent  -     albuterol (PROVENTIL HFA,VENTOLIN HFA) 90 mcg/act inhaler; Inhale 2 puffs every 6 (six) hours as needed for wheezing  -     Mometasone Furoate (Asmanex HFA) 100 MCG/ACT AERO; Inhale 2 puffs (200 mcg total) 2 (two) times a day Rinse mouth after use.  -     predniSONE 20  mg tablet; Take 2 tablets (40 mg total) by mouth daily for 5 days  -     Regional Panel 1; Future        No problem-specific Assessment & Plan notes found for this encounter.      PHQ-2/9 Depression Screening    Little interest or pleasure in doing things: 0 - not at all  Feeling down, depressed, or hopeless: 0 - not at all  PHQ-2 Score: 0  PHQ-2 Interpretation: Negative depression screen          Body mass index is 34.82 kg/m².    BMI Counseling: Body mass index is 34.82 kg/m². The BMI     Subjective:      Patient ID: Smith Bravo is a 28 y.o. male.    Patient presents with a chief complaint of cough sore throat and wheezing of approximately 1 months duration.  The patient states that 1 night he was choking and could not catch his breath presents to the emergency room July 21.  Workup was negative.    Cough  Associated symptoms include a sore throat and wheezing. Pertinent negatives include no chest pain, chills, ear pain, fever, rash or shortness of breath.   Sore Throat   Associated symptoms include coughing. Pertinent negatives include no abdominal pain, ear pain, shortness of breath or vomiting.       The following portions of the patient's history were reviewed and updated as appropriate:   He has no past medical history on file.,  does not have any pertinent problems on file.,   has no past surgical history on file.,  family history is not on file.,   reports that he has never smoked. He has never used smokeless tobacco. He reports current alcohol use. He reports that he does not currently use drugs.,  has No Known Allergies..  Current Outpatient Medications   Medication Sig Dispense Refill    albuterol (PROVENTIL HFA,VENTOLIN HFA) 90 mcg/act inhaler Inhale 2 puffs every 6 (six) hours as needed for wheezing 18 g 0    azithromycin (Zithromax) 250 mg tablet Take 2 tablets (500 mg total) by mouth daily for 1 day, THEN 1 tablet (250 mg total) daily for 4 days. 6 tablet 0    benzonatate (TESSALON) 200 MG capsule  "Take 1 capsule (200 mg total) by mouth 3 (three) times a day as needed for cough 30 capsule 0    Mometasone Furoate (Asmanex HFA) 100 MCG/ACT AERO Inhale 2 puffs (200 mcg total) 2 (two) times a day Rinse mouth after use. 13 g 3    predniSONE 20 mg tablet Take 2 tablets (40 mg total) by mouth daily for 5 days 10 tablet 0    naproxen (NAPROSYN) 500 mg tablet Take 1 tablet (500 mg total) by mouth 2 (two) times a day with meals for 7 days (Patient not taking: Reported on 8/12/2024) 14 tablet 0     No current facility-administered medications for this visit.       Review of Systems   Constitutional:  Negative for chills and fever.   HENT:  Positive for sore throat. Negative for ear pain.    Eyes:  Negative for pain and visual disturbance.   Respiratory:  Positive for cough and wheezing. Negative for shortness of breath.    Cardiovascular:  Negative for chest pain and palpitations.   Gastrointestinal:  Negative for abdominal pain and vomiting.   Genitourinary:  Negative for dysuria and hematuria.   Musculoskeletal:  Negative for arthralgias and back pain.   Skin:  Negative for color change and rash.   Neurological:  Negative for seizures and syncope.   All other systems reviewed and are negative.        Objective:    /80 (BP Location: Left arm, Patient Position: Sitting)   Pulse 86   Temp 98.6 °F (37 °C) (Tympanic)   Ht 6' 5\" (1.956 m)   Wt 133 kg (293 lb 9.6 oz)   SpO2 96%   BMI 34.82 kg/m²   Body mass index is 34.82 kg/m².     Physical Exam  Constitutional:       Appearance: Normal appearance. He is well-developed.   HENT:      Head: Normocephalic and atraumatic.      Right Ear: Tympanic membrane, ear canal and external ear normal.      Left Ear: Tympanic membrane, ear canal and external ear normal.      Nose: Nose normal.      Mouth/Throat:      Mouth: Mucous membranes are moist.      Pharynx: Oropharynx is clear.   Eyes:      Extraocular Movements: Extraocular movements intact.      Conjunctiva/sclera: " Conjunctivae normal.      Pupils: Pupils are equal, round, and reactive to light.   Cardiovascular:      Rate and Rhythm: Normal rate and regular rhythm.      Pulses: Normal pulses.      Heart sounds: Normal heart sounds.   Pulmonary:      Effort: Pulmonary effort is normal.      Breath sounds: Normal breath sounds.   Abdominal:      General: Abdomen is flat. Bowel sounds are normal.      Palpations: Abdomen is soft.      Tenderness: There is no abdominal tenderness.   Musculoskeletal:         General: Normal range of motion.      Cervical back: Normal range of motion and neck supple.   Skin:     General: Skin is warm and dry.      Capillary Refill: Capillary refill takes less than 2 seconds.   Neurological:      General: No focal deficit present.      Mental Status: He is alert and oriented to person, place, and time.   Psychiatric:         Mood and Affect: Mood normal.         Behavior: Behavior normal.         Thought Content: Thought content normal.         Judgment: Judgment normal.

## 2025-05-01 ENCOUNTER — OFFICE VISIT (OUTPATIENT)
Dept: FAMILY MEDICINE CLINIC | Facility: CLINIC | Age: 30
End: 2025-05-01
Payer: COMMERCIAL

## 2025-05-01 ENCOUNTER — APPOINTMENT (OUTPATIENT)
Dept: LAB | Facility: HOSPITAL | Age: 30
End: 2025-05-01
Payer: COMMERCIAL

## 2025-05-01 ENCOUNTER — NURSE TRIAGE (OUTPATIENT)
Age: 30
End: 2025-05-01

## 2025-05-01 VITALS
HEART RATE: 88 BPM | SYSTOLIC BLOOD PRESSURE: 154 MMHG | TEMPERATURE: 98.1 F | DIASTOLIC BLOOD PRESSURE: 88 MMHG | BODY MASS INDEX: 33.3 KG/M2 | RESPIRATION RATE: 20 BRPM | HEIGHT: 77 IN | WEIGHT: 282 LBS

## 2025-05-01 DIAGNOSIS — J45.909 MILD ASTHMA WITHOUT COMPLICATION, UNSPECIFIED WHETHER PERSISTENT: ICD-10-CM

## 2025-05-01 DIAGNOSIS — J40 BRONCHITIS: ICD-10-CM

## 2025-05-01 DIAGNOSIS — J30.2 SEASONAL ALLERGIES: ICD-10-CM

## 2025-05-01 DIAGNOSIS — J45.909 MILD ASTHMA WITHOUT COMPLICATION, UNSPECIFIED WHETHER PERSISTENT: Primary | ICD-10-CM

## 2025-05-01 LAB
BASOPHILS # BLD AUTO: 0.07 THOUSANDS/ÂΜL (ref 0–0.1)
BASOPHILS NFR BLD AUTO: 1 % (ref 0–1)
EOSINOPHIL # BLD AUTO: 0.55 THOUSAND/ÂΜL (ref 0–0.61)
EOSINOPHIL NFR BLD AUTO: 9 % (ref 0–6)
ERYTHROCYTE [DISTWIDTH] IN BLOOD BY AUTOMATED COUNT: 11.9 % (ref 11.6–15.1)
HCT VFR BLD AUTO: 47 % (ref 36.5–49.3)
HGB BLD-MCNC: 16.3 G/DL (ref 12–17)
IMM GRANULOCYTES # BLD AUTO: 0.01 THOUSAND/UL (ref 0–0.2)
IMM GRANULOCYTES NFR BLD AUTO: 0 % (ref 0–2)
LYMPHOCYTES # BLD AUTO: 1.84 THOUSANDS/ÂΜL (ref 0.6–4.47)
LYMPHOCYTES NFR BLD AUTO: 32 % (ref 14–44)
MCH RBC QN AUTO: 30.9 PG (ref 26.8–34.3)
MCHC RBC AUTO-ENTMCNC: 34.7 G/DL (ref 31.4–37.4)
MCV RBC AUTO: 89 FL (ref 82–98)
MONOCYTES # BLD AUTO: 0.3 THOUSAND/ÂΜL (ref 0.17–1.22)
MONOCYTES NFR BLD AUTO: 5 % (ref 4–12)
NEUTROPHILS # BLD AUTO: 3.07 THOUSANDS/ÂΜL (ref 1.85–7.62)
NEUTS SEG NFR BLD AUTO: 53 % (ref 43–75)
NRBC BLD AUTO-RTO: 0 /100 WBCS
PLATELET # BLD AUTO: 225 THOUSANDS/UL (ref 149–390)
PMV BLD AUTO: 10 FL (ref 8.9–12.7)
RBC # BLD AUTO: 5.28 MILLION/UL (ref 3.88–5.62)
WBC # BLD AUTO: 5.84 THOUSAND/UL (ref 4.31–10.16)

## 2025-05-01 PROCEDURE — 36415 COLL VENOUS BLD VENIPUNCTURE: CPT

## 2025-05-01 PROCEDURE — 86003 ALLG SPEC IGE CRUDE XTRC EA: CPT

## 2025-05-01 PROCEDURE — 99214 OFFICE O/P EST MOD 30 MIN: CPT | Performed by: FAMILY MEDICINE

## 2025-05-01 PROCEDURE — 82785 ASSAY OF IGE: CPT

## 2025-05-01 PROCEDURE — 86738 MYCOPLASMA ANTIBODY: CPT

## 2025-05-01 PROCEDURE — 85025 COMPLETE CBC W/AUTO DIFF WBC: CPT

## 2025-05-01 RX ORDER — METHYLPREDNISOLONE 4 MG/1
TABLET ORAL
Qty: 21 EACH | Refills: 0 | Status: SHIPPED | OUTPATIENT
Start: 2025-05-01

## 2025-05-01 RX ORDER — FLUTICASONE PROPIONATE 50 MCG
1 SPRAY, SUSPENSION (ML) NASAL DAILY
Qty: 3 ML | Refills: 1 | Status: SHIPPED | OUTPATIENT
Start: 2025-05-01

## 2025-05-01 RX ORDER — MONTELUKAST SODIUM 10 MG/1
10 TABLET ORAL
Qty: 90 TABLET | Refills: 3 | Status: SHIPPED | OUTPATIENT
Start: 2025-05-01

## 2025-05-01 NOTE — TELEPHONE ENCOUNTER
"FOLLOW UP: OV today    REASON FOR CONVERSATION: Sinus Problem    SYMPTOMS: Sinus congestion, itchy eyes.    OTHER: Believes this is seasonal allergies and would like to discuss testing and treatment options.     DISPOSITION: See Today or Tomorrow in Office      Reason for Disposition   Patient wants to be seen    Answer Assessment - Initial Assessment Questions  1. LOCATION: \"Where does it hurt?\"       Sinuses  2. ONSET: \"When did the sinus pain start?\"  (e.g., hours, days)       One week  3. SEVERITY: \"How bad is the pain?\"   (Scale 0-10; or none, mild, moderate or severe)      Congestions - impeding breathing through nose.    4. RECURRENT SYMPTOM: \"Have you ever had sinus problems before?\" If Yes, ask: \"When was the last time?\" and \"What happened that time?\"       Not like this    5. NASAL CONGESTION: \"Is the nose blocked?\" If Yes, ask: \"Can you open it or must you breathe through your mouth?\"      Both blocked    6. NASAL DISCHARGE: \"Do you have discharge from your nose?\" If so ask, \"What color?\"      Mucous running down throat    7. FEVER: \"Do you have a fever?\" If Yes, ask: \"What is it, how was it measured, and when did it start?\"       Denies    8. OTHER SYMPTOMS: \"Do you have any other symptoms?\" (e.g., sore throat, cough, earache, difficulty breathing)      Itchy eyes    Protocols used: Sinus Pain or Congestion-Adult-[place]    "

## 2025-05-01 NOTE — PROGRESS NOTES
Name: Smith Bravo      : 1995      MRN: [id number]  Encounter Provider: Nadir Banuelos DO  Encounter Date: 2025   Encounter department: [facility]  :  Assessment & Plan  Mild asthma without complication, unspecified whether persistent  The patient will continue Asmanex as a maintenance inhaler and albuterol as a rescue inhaler  Orders:  •  methylPREDNISolone 4 MG tablet therapy pack; Use as directed on package  •  montelukast (SINGULAIR) 10 mg tablet; Take 1 tablet (10 mg total) by mouth daily at bedtime    Seasonal allergies  We will provide a Medrol Dosepak followed by Flonase nasal spray 2 sprays daily and will add Singulair 10 mg to the Claritin 10 mg daily the patient will still take Benadryl on an as-needed basis.  Informed the patient that there is regional [facility] laboratory in the system.  Orders:  •  methylPREDNISolone 4 MG tablet therapy pack; Use as directed on package  •  fluticasone (FLONASE) 50 mcg/act nasal spray; 1 spray into each nostril daily  •  montelukast (SINGULAIR) 10 mg tablet; Take 1 tablet (10 mg total) by mouth daily at bedtime          Depression Screening and Follow-up Plan: Patient was screened for depression during today's encounter. They screened negative with a PHQ-2 score of 0.        History of Present Illness   Patient presents with concern of allergies he currently is taking Claritin that he states is ineffective he takes Benadryl 50 mg every 4 hours as needed with some relief      Review of Systems   Constitutional:  Negative for chills and fever.   HENT:  Positive for postnasal drip, rhinorrhea and sneezing. Negative for ear pain and sore throat.    Eyes:  Positive for itching. Negative for pain and visual disturbance.   Respiratory:  Positive for shortness of breath and wheezing. Negative for cough.    Cardiovascular:  Negative for chest pain and palpitations.   Gastrointestinal:  Negative for abdominal pain and vomiting.  "  Genitourinary:  Negative for dysuria and hematuria.   Musculoskeletal:  Negative for arthralgias and back pain.   Skin:  Negative for color change and rash.   Neurological:  Negative for seizures and syncope.   All other systems reviewed and are negative.      Objective   /88   Pulse 88   Temp 98.1 °F (36.7 °C)   Resp 20   Ht 6' 5\" (1.956 m)   Wt 128 kg (282 lb)   BMI 33.44 kg/m²      Physical Exam  Constitutional:       Appearance: Normal appearance.   HENT:      Head: Normocephalic and atraumatic.      Right Ear: Tympanic membrane, ear canal and external ear normal.      Left Ear: Tympanic membrane, ear canal and external ear normal.      Nose: Congestion and rhinorrhea present.      Mouth/Throat:      Mouth: Mucous membranes are moist.      Pharynx: Oropharynx is clear.   Eyes:      General: Allergic shiner present.      Extraocular Movements: Extraocular movements intact.      Conjunctiva/sclera: Conjunctivae normal.      Pupils: Pupils are equal, round, and reactive to light.   Cardiovascular:      Rate and Rhythm: Normal rate and regular rhythm.      Pulses: Normal pulses.      Heart sounds: Normal heart sounds.   Pulmonary:      Effort: Pulmonary effort is normal.      Breath sounds: Normal breath sounds.   Abdominal:      General: Abdomen is flat. Bowel sounds are normal.      Palpations: Abdomen is soft.   Musculoskeletal:         General: Normal range of motion.      Cervical back: Normal range of motion.   Skin:     General: Skin is warm and dry.      Capillary Refill: Capillary refill takes less than 2 seconds.   Neurological:      General: No focal deficit present.      Mental Status: He is alert and oriented to person, place, and time.   Psychiatric:         Mood and Affect: Mood normal.         Behavior: Behavior normal.         "

## 2025-05-01 NOTE — ASSESSMENT & PLAN NOTE
The patient will continue Asmanex as a maintenance inhaler and albuterol as a rescue inhaler  Orders:  •  methylPREDNISolone 4 MG tablet therapy pack; Use as directed on package  •  montelukast (SINGULAIR) 10 mg tablet; Take 1 tablet (10 mg total) by mouth daily at bedtime

## 2025-05-02 LAB
A ALTERNATA IGE QN: 0.17 KUA/I (ref 0–0.1)
A FUMIGATUS IGE QN: <0.1 KUA/I (ref 0–0.1)
BERMUDA GRASS IGE QN: 0.98 KUA/I (ref 0–0.1)
BOXELDER IGE QN: 1.41 KUA/I (ref 0–0.1)
C HERBARUM IGE QN: <0.1 KUA/I (ref 0–0.1)
CAT DANDER IGE QN: <0.1 KUA/I (ref 0–0.1)
CMN PIGWEED IGE QN: 0.44 KUA/I (ref 0–0.1)
COMMON RAGWEED IGE QN: 2.71 KUA/I (ref 0–0.1)
COTTONWOOD IGE QN: 0.61 KUA/I (ref 0–0.1)
D FARINAE IGE QN: 4.3 KUA/I (ref 0–0.1)
D PTERONYSS IGE QN: 4.33 KUA/I (ref 0–0.1)
DOG DANDER IGE QN: 0.12 KUA/I (ref 0–0.1)
LONDON PLANE IGE QN: 0.68 KUA/I (ref 0–0.1)
MOUSE URINE PROT IGE QN: <0.1 KUA/I (ref 0–0.1)
MT JUNIPER IGE QN: 0.37 KUA/I (ref 0–0.1)
MUGWORT IGE QN: 0.4 KUA/I (ref 0–0.1)
P NOTATUM IGE QN: 0.35 KUA/I (ref 0–0.1)
ROACH IGE QN: 0.4 KUA/I (ref 0–0.1)
SHEEP SORREL IGE QN: 0.74 KUA/I (ref 0–0.1)
SILVER BIRCH IGE QN: 10.8 KUA/I (ref 0–0.1)
TIMOTHY IGE QN: 1.47 KUA/I (ref 0–0.1)
TOTAL IGE SMQN RAST: 120 KU/L (ref 0–113)
WALNUT IGE QN: 1.05 KUA/I (ref 0–0.1)
WHITE ASH IGE QN: 0.66 KUA/I (ref 0–0.1)
WHITE ELM IGE QN: 0.91 KUA/I (ref 0–0.1)
WHITE MULBERRY IGE QN: 0.38 KUA/I (ref 0–0.1)
WHITE OAK IGE QN: 7.46 KUA/I (ref 0–0.1)

## 2025-05-03 LAB
M PNEUMO IGG SER IA-ACNC: 1899 U/ML (ref 0–99)
M PNEUMO IGM SER IA-ACNC: <770 U/ML (ref 0–769)

## 2025-05-05 ENCOUNTER — RESULTS FOLLOW-UP (OUTPATIENT)
Dept: FAMILY MEDICINE CLINIC | Facility: CLINIC | Age: 30
End: 2025-05-05

## 2025-05-05 NOTE — TELEPHONE ENCOUNTER
----- Message from Nadir Banuelos DO sent at 5/5/2025 11:04 AM EDT -----  Please call the patient regarding his abnormal result.  Schedule recheck regarding his lab

## 2025-05-21 ENCOUNTER — OFFICE VISIT (OUTPATIENT)
Dept: FAMILY MEDICINE CLINIC | Facility: CLINIC | Age: 30
End: 2025-05-21

## 2025-05-21 VITALS
HEART RATE: 76 BPM | WEIGHT: 284 LBS | DIASTOLIC BLOOD PRESSURE: 84 MMHG | HEIGHT: 77 IN | RESPIRATION RATE: 16 BRPM | BODY MASS INDEX: 33.53 KG/M2 | TEMPERATURE: 96.8 F | SYSTOLIC BLOOD PRESSURE: 148 MMHG

## 2025-05-21 DIAGNOSIS — L23.7 POISON IVY: ICD-10-CM

## 2025-05-21 DIAGNOSIS — A49.3 MYCOPLASMA INFECTION: Primary | ICD-10-CM

## 2025-05-21 DIAGNOSIS — J45.909 MILD ASTHMA WITHOUT COMPLICATION, UNSPECIFIED WHETHER PERSISTENT: ICD-10-CM

## 2025-05-21 DIAGNOSIS — J45.909 ASTHMA DUE TO SEASONAL ALLERGIES: ICD-10-CM

## 2025-05-21 RX ORDER — AZITHROMYCIN 250 MG/1
TABLET, FILM COATED ORAL
Qty: 6 TABLET | Refills: 1 | Status: SHIPPED | OUTPATIENT
Start: 2025-05-21 | End: 2025-05-26

## 2025-05-21 NOTE — PROGRESS NOTES
Name: Smith Bravo      : 1995      MRN: [id number]  Encounter Provider: Nadir Banuelos DO  Encounter Date: 2025   Encounter department: [facility] PRIMARY CARE  :  Assessment & Plan  Mycoplasma infection  Patient has an mycoplasma IgG titer of 1899 IgM is negative..  We will supply Zithromax to take for 5 days stay off 5 days and do a second pack for 5 days which will give him 20 days of coverage.  Orders:  •  azithromycin (Zithromax) 250 mg tablet; Take 2 tablets (500 mg total) by mouth daily for 1 day, THEN 1 tablet (250 mg total) daily for 4 days.    Asthma due to seasonal allergies  Patient has multiple allergies as seen on the [facility] panel.  The patient was informed if his problem persisted we may consider referral to an allergist       Mild asthma without complication, unspecified whether persistent  Patient is lessening the frequency of his use of albuterol        [facility] Allergy Panel, Adult  Status: Final result      Contains abnormal data [facility] Allergy Panel, Adult  Order: [id number]   Status: Final result       Next appt: None       Dx: Bronchitis; Mild asthma without compl...    Test Result Released: Yes (not seen)    1 Result Note       View Follow-Up Encounter      Component  Ref Range & Units (hover) 25  2:37 PM   A.ALTERNATA 0.17 High    A.FUMIGATUS <0.10   [place] Grass 0.98 High    [place] 1.41 High    Cat Epithellium-Dander <0.10   C.HERBARUM <0.10   Cockroach 0.40 High    Common Silver Birch 10.80 High    [place] 0.61 High    D. farinae 4.30 High    D. pteronyssinus 4.33 High    Dog Dander 0.12 High    Elm IgE 0.91 High    Mountain [place] Tree 0.37 High    Mugwort 0.40 High    [place] Tree 0.38 High    [place] 7.46 High    P.CHRYSOGENUM 0.35 High    Rough Pigweed  IgE 0.44 High    Common Ragweed 2.71 High    Sheep Sorrel IgE 0.74 High    [place] Tree 0.68 High    [place] Grass 1.47 High    [place] Tree 1.05 High    [place] Tree 0.66 High    IgE  120 High    MOUSE URINE <0.10             Specimen Collected: 05/01/25  2:37 PM Last Resulted: 05/02/25 12:41 PM        Lab Flowsheet        Order Details        View Encounter        Lab and Collection Details        Routing        Result History     View All Conversations on this Encounter     Result Care Coordination      Result Notes     Nadir Banuelos DO  5/5/2025 11:04 AM EDT Back to Top      Please call the patient regarding his abnormal result.  Schedule recheck regarding his lab     Patient Communication     Add Comments   Add Notifications  Back to Top        Encounters Related to Results    5/5/2025 Results Follow-Up  5/1/2025 Appointment (Ordering Encounter) Back to Top     Ordered On 5/1/2025  2:36 PM    Ordering Provider Authorizing Provider Ordering User Ordering Department   DO Nadir Staley DO Sophia Samoleski [place] [facility] Family Medicine Lab Lab    [phone]     Other Results from 5/1/2025       Contains abnormal data CBC and differential  Order: [id number]   Status: Final result         Next appt: None         Dx: Bronchitis         Test Result Released: Yes (not seen)      1 Result Note         View Follow-Up Encounter           Component  Ref Range & Units (hover) 5/1/25  2:37 PM 5/8/21  1:22 AM   WBC 5.84 6.38   RBC 5.28 5.07   Hemoglobin 16.3 15.7   Hematocrit 47.0 45.6   MCV 89 90   MCH 30.9 31.0   MCHC 34.7 34.4   RDW 11.9 12.3   MPV 10.0 9.9   Platelets 225 200   nRBC 0 0   Segmented % 53 41 Low    Immature Grans % 0 0   Lymphocytes % 32 42   Monocytes % 5 6   Eosinophils Relative 9 High  10 High    Basophils Relative 1 1   Absolute Neutrophils 3.07 2.62   Absolute Immature Grans 0.01 0.02   Absolute Lymphocytes 1.84 2.65   Absolute Monocytes 0.30 0.35   Eosinophils Absolute 0.55 0.66 High    Basophils Absolute 0.07 0.08             Specimen Collected: 05/01/25  2:37 PM Last Resulted: 05/01/25  3:08 PM         Lab Flowsheet          Order Details          View Encounter          Lab and Collection Details          Routing          Result History       View All Conversations on this Encounter       Result Care Coordination        Result Notes     Nadir DO Lakisha  5/5/2025 11:04 AM EDT Back to Top        Please call the patient regarding his abnormal result.  Schedule recheck regarding his lab       Patient Communication     Add Comments   Add Notifications  Back to Top          Encounters Related to Results    5/5/2025 Results Follow-Up  8/12/2024 Office Visit (Ordering Encounter)  5/1/2025 Appointment (Resulting Encounter) Back to Top     Ordered On 8/12/2024 12:34 PM    Ordering Provider Authorizing Provider Ordering User Ordering Department   DO Nadir Staley, DO Nadir Banuelos,  [facility] PRIMARY CARE   Family Medicine Family Medicine Family Medicine Family Medicine    [phone]         Contains abnormal data Mycoplasma Pneumoniae AB, IgG/IgM  Order: [id number]   Status: Final result         Next appt: None         Dx: Bronchitis         Test Result Released: Yes (not seen)      1 Result Note         View Follow-Up Encounter          Component  Ref Range & Units (hover) 5/1/25  2:37 PM   Mycoplasma pneumo IgG 1899 High    Comment:                              Negative:           <100                               Indeterminate: 100 - 320                               Positive:           >320  The reference interval established is intended as a  baseline only.  Values >100 may indicate a recent  infection with Mycoplasma pneumoniae and need to be  confirmed either by a positive IgM result and/or an  additional specimen drawn 2-4 weeks later showing a  significant increase in antibody levels.   Mycoplasma pneumo IgM <770   Comment:                              Negative            <770  Clinically significant amount of M. pneumoniae  antibody  not detected.                               Low Positive   770 - 950  M. pneumoniae specific IgM presumptively detected.  It  is recommended that another sample be collected 1-2  weeks later to assure reactivity.                               Positive            >950  Highly significant amount of M. pneumoniae specific  IgM antibody detected.                Narrative    Performed at:  [address] [place] [address]  [id number]  : Sisi Noel MD, Phone:  [phone]   Specimen Collected: 05/01/25  2:37 PM Last Resulted: 05/03/25  4:05 PM        Lab Flowsheet          Order Details          View Encounter          Lab and Collection Details          Routing          Result History       View All Conversations on this Encounter       Result Care Coordination        Result Notes     Nadir Banuelos DO  5/5/2025 11:04 AM EDT Back to Top        Please call the patient regarding his abnormal result.  Schedule recheck regarding his lab       Patient Communication     Add Comments   Add Notifications  Back to Top          Encounters Related to Results    5/5/2025 Results Follow-Up  8/12/2024 Office Visit (Ordering Encounter)  5/1/2025 Appointment (Resulting Encounter) Back to Top     Ordered On 8/12/2024 12:34 PM    Ordering Provider Authorizing Provider Ordering User Ordering Department   Nadir Banuelos, DO Nadir Banuelos, DO Nadir Banuelos,  [facility] PRIMARY CARE   Family Medicine Family Medicine Family Medicine Family Medicine    [phone]  [phone]  [phone]  [phone]     Imaging    [facility] Allergy Panel, Adult (Order: [id number]) - 5/1/2025  Lab Order Result Printout    [facility] Allergy Panel, Adult (Order #[id number]) on 5/1/25     External Results Report    Open External Results Report     Lab Component SmartPhrase Guide    [facility] Allergy Panel, Adult (Order #[id number]) on 5/1/25     Specimen Description:     5/2/2025 12:41 PM    Component  Value Flag Ref Range Units Status   A.ALTERNATA 0.17  High  0.00 - 0.09 kUA/I Final   A.FUMIGATUS <0.10  0.00 - 0.09 kUA/I Final   [place] Grass 0.98  High  0.00 - 0.09 kUA/I Final   [place] 1.41  High  0.00 - 0.09 kUA/I Final   Cat Epithellium-Dander <0.10  0.00 - 0.09 kUA/I Final   C.HERBARUM <0.10  0.00 - 0.09 kUA/I Final   Cockroach 0.40  High  0.00 - 0.09 kUA/I Final   Common Silver Birch 10.80  High  0.00 - 0.09 kUA/I Final   [place] 0.61  High  0.00 - 0.09 kUA/I Final   D. farinae 4.30  High  0.00 - 0.09 kUA/I Final   D. pteronyssinus 4.33  High  0.00 - 0.09 kUA/I Final   Dog Dander 0.12  High  0.00 - 0.09 kUA/I Final   Elm IgE 0.91  High  0.00 - 0.09 kUA/I Final   Mountain [place] Tree 0.37  High  0.00 - 0.09 kUA/I Final   Mugwort 0.40  High  0.00 - 0.09 kUA/I Final   [place] Tree 0.38  High  0.00 - 0.09 kUA/I Final   Oak 7.46  High  0.00 - 0.09 kUA/I Final   P.CHRYSOGENUM 0.35  High  0.00 - 0.09 kUA/I Final   Rough Pigweed  IgE 0.44  High  0.00 - 0.09 kUA/I Final   Common Ragweed 2.71  High  0.00 - 0.09 kUA/I Final   Sheep Sorrel IgE 0.74  High  0.00 - 0.09 kUA/I Final   [place] Tree 0.68  High  0.00 - 0.09 kUA/I Final   [place]rendon Grass 1.47  High  0.00 - 0.09 kUA/I Final   [place] Tree 1.05  High  0.00 - 0.09 kUA/I Final   White Jerad Tree 0.66  High  0.00 - 0.09 kUA/I Final   IgE 120  High  0 - 113 kU/l Final   MOUSE URINE <0.10  0.00 - 0.09 kUA/I Final     All Reviewers List    Jazmine Banuelos MA on 5/6/2025  1:54 PM   Nadir Banuelos DO on 5/5/2025 11:04 AM     Lab Information    Lab   [facility] ([facility])   [address] [facility] [address]  Tel: [phone]  : Modesto Mccollum MD-Lab Medical Director               Additional Information    Specimen ID Bill Type Client ID   [id number] Client             Specimen Date Taken Specimen Time Taken Specimen Received Date Specimen Received Time Result Date Result Time   May 1, 2025  2:37 PM May 1, 2025  7:49 PM May 2, 2025 12:41 PM  "    Routing History    Priority Sent On From To Message Type    5/5/2025 11:04 AM DO Jazmine Staley MA Result Notes    5/1/2025  3:08 PM Lab, Background User Nadir Banuelos DO Results     Results Routing Details    Order ID: [id number]   Result contact date: 5/1/25   Result status: Final result   Outcome: Routed using routing scheme   Routing Scheme Used: SL SYSTEM DEFINITION RESULTS ROUTING [[id number]]   Routing Scheme Line: Default   Resulting User: Lab, Background User [LABBACKGROUND]   Routing Instant: Fri May 2, 2025 12:41 PM   Current Status: Routing Complete   Status History: Result contact created Thu May 1, 2025  2:59 PM     Routing Complete Thu May 1, 2025  2:59 PM     Routing started Fri May 2, 2025 12:41 PM     Routing Complete Fri May 2, 2025 12:41 PM   In Basket Sent: Message ID: [id number]  Recipients:         Nadir Banuelos DO  [[id number]]              Responsible: Yes     Other Results from 5/1/2025     CBC and differential Final result 5/1/2025    Mycoplasma Pneumoniae AB, IgG/IgM Final result 5/1/2025            History of Present Illness   The patient presents to go over recent laboratory      Review of Systems   Constitutional:  Negative for chills and fever.   HENT:  Negative for ear pain and sore throat.    Eyes:  Negative for pain and visual disturbance.   Respiratory:  Positive for cough and wheezing. Negative for shortness of breath.         The cough is less and the wheezing has lessened   Cardiovascular:  Negative for chest pain and palpitations.   Gastrointestinal:  Negative for abdominal pain and vomiting.   Genitourinary:  Negative for dysuria and hematuria.   Musculoskeletal:  Negative for arthralgias and back pain.   Skin:  Negative for color change and rash.   Neurological:  Negative for seizures and syncope.   All other systems reviewed and are negative.      Objective   /84   Pulse 76   Temp (!) 96.8 °F (36 °C)   Resp 16   Ht 6' 5\" (1.956 m)   Wt " 129 kg (284 lb)   BMI 33.68 kg/m²      Physical Exam  Constitutional:       Appearance: Normal appearance.   HENT:      Head: Normocephalic and atraumatic.      Right Ear: Tympanic membrane, ear canal and external ear normal.      Left Ear: Tympanic membrane, ear canal and external ear normal.      Nose: Nose normal.      Mouth/Throat:      Mouth: Mucous membranes are moist.      Pharynx: Oropharynx is clear.     Eyes:      Extraocular Movements: Extraocular movements intact.      Conjunctiva/sclera: Conjunctivae normal.      Pupils: Pupils are equal, round, and reactive to light.       Cardiovascular:      Rate and Rhythm: Normal rate and regular rhythm.      Pulses: Normal pulses.      Heart sounds: Normal heart sounds.   Pulmonary:      Effort: Pulmonary effort is normal.      Breath sounds: Normal breath sounds.   Abdominal:      General: Abdomen is flat. Bowel sounds are normal.      Palpations: Abdomen is soft.     Musculoskeletal:         General: Normal range of motion.      Cervical back: Normal range of motion.     Skin:     General: Skin is warm and dry.      Capillary Refill: Capillary refill takes less than 2 seconds.     Neurological:      General: No focal deficit present.      Mental Status: He is alert and oriented to person, place, and time.     Psychiatric:         Mood and Affect: Mood normal.         Behavior: Behavior normal.

## 2025-05-21 NOTE — ASSESSMENT & PLAN NOTE
Patient is lessening the frequency of his use of albuterol        [facility] Allergy Panel, Adult  Status: Final result      Contains abnormal data [facility] Allergy Panel, Adult  Order: [id number]   Status: Final result       Next appt: None       Dx: Bronchitis; Mild asthma without compl...    Test Result Released: Yes (not seen)    1 Result Note       View Follow-Up Encounter      Component  Ref Range & Units (hover) 5/1/25  2:37 PM   A.ALTERNATA 0.17 High    A.FUMIGATUS <0.10   [place] Grass 0.98 High    [place] 1.41 High    Cat Epithellium-Dander <0.10   C.HERBARUM <0.10   Cockroach 0.40 High    Common Silver Birch 10.80 High    [place] 0.61 High    D. farinae 4.30 High    D. pteronyssinus 4.33 High    Dog Dander 0.12 High    Elm IgE 0.91 High    Mountain [place] Tree 0.37 High    Mugwort 0.40 High    [place] Tree 0.38 High    [place] 7.46 High    P.CHRYSOGENUM 0.35 High    Rough Pigweed  IgE 0.44 High    Common Ragweed 2.71 High    Sheep Sorrel IgE 0.74 High    [place] Tree 0.68 High    Brendon Grass 1.47 High    [place] Tree 1.05 High    White Jerad Tree 0.66 High    IgE 120 High    MOUSE URINE <0.10             Specimen Collected: 05/01/25  2:37 PM Last Resulted: 05/02/25 12:41 PM        Lab Flowsheet        Order Details        View Encounter        Lab and Collection Details        Routing        Result History     View All Conversations on this Encounter     Result Care Coordination      Result Notes     Nadir Banuelos DO  5/5/2025 11:04 AM EDT Back to Top      Please call the patient regarding his abnormal result.  Schedule recheck regarding his lab     Patient Communication     Add Comments   Add Notifications  Back to Top        Encounters Related to Results    5/5/2025 Results Follow-Up  5/1/2025 Appointment (Ordering Encounter) Back to Top     Ordered On 5/1/2025  2:36 PM    Ordering Provider Authorizing Provider Ordering User Ordering Department   DO Nadir Staley DO Sophia  [facility] OP LAB [facility]   Family Medicine Family Medicine Lab Lab    [phone]  [phone]     Other Results from 5/1/2025       Contains abnormal data CBC and differential  Order: [id number]   Status: Final result         Next appt: None         Dx: Bronchitis         Test Result Released: Yes (not seen)      1 Result Note         View Follow-Up Encounter           Component  Ref Range & Units (hover) 5/1/25  2:37 PM 5/8/21  1:22 AM   WBC 5.84 6.38   RBC 5.28 5.07   Hemoglobin 16.3 15.7   Hematocrit 47.0 45.6   MCV 89 90   MCH 30.9 31.0   MCHC 34.7 34.4   RDW 11.9 12.3   MPV 10.0 9.9   Platelets 225 200   nRBC 0 0   Segmented % 53 41 Low    Immature Grans % 0 0   Lymphocytes % 32 42   Monocytes % 5 6   Eosinophils Relative 9 High  10 High    Basophils Relative 1 1   Absolute Neutrophils 3.07 2.62   Absolute Immature Grans 0.01 0.02   Absolute Lymphocytes 1.84 2.65   Absolute Monocytes 0.30 0.35   Eosinophils Absolute 0.55 0.66 High    Basophils Absolute 0.07 0.08             Specimen Collected: 05/01/25  2:37 PM Last Resulted: 05/01/25  3:08 PM        Lab Flowsheet          Order Details          View Encounter          Lab and Collection Details          Routing          Result History       View All Conversations on this Encounter       Result Care Coordination        Result Notes     aNdir Banuelos DO  5/5/2025 11:04 AM EDT Back to Top        Please call the patient regarding his abnormal result.  Schedule recheck regarding his lab       Patient Communication     Add Comments   Add Notifications  Back to Top          Encounters Related to Results    5/5/2025 Results Follow-Up  8/12/2024 Office Visit (Ordering Encounter)  5/1/2025 Appointment (Resulting Encounter) Back to Top     Ordered On 8/12/2024 12:34 PM    Ordering Provider Authorizing Provider Ordering User Ordering Department   DO Nadir Staley DO Joseph Mussoline, DO [facility]  Medicine Family Medicine Family Medicine Family Medicine    [phone]         Contains abnormal data Mycoplasma Pneumoniae AB, IgG/IgM  Order: [id number]   Status: Final result         Next appt: None         Dx: Bronchitis         Test Result Released: Yes (not seen)      1 Result Note         View Follow-Up Encounter          Component  Ref Range & Units (hover) 5/1/25  2:37 PM   Mycoplasma pneumo IgG 1899 High    Comment:                              Negative:           <100                               Indeterminate: 100 - 320                               Positive:           >320  The reference interval established is intended as a  baseline only.  Values >100 may indicate a recent  infection with Mycoplasma pneumoniae and need to be  confirmed either by a positive IgM result and/or an  additional specimen drawn 2-4 weeks later showing a  significant increase in antibody levels.   Mycoplasma pneumo IgM <770   Comment:                              Negative            <770  Clinically significant amount of M. pneumoniae antibody  not detected.                               Low Positive   770 - 950  M. pneumoniae specific IgM presumptively detected.  It  is recommended that another sample be collected 1-2  weeks later to assure reactivity.                               Positive            >950  Highly significant amount of M. pneumoniae specific  IgM antibody detected.                Narrative    Performed at:  [address] [place] [address]  [id number]  : Sisi Noel MD, Phone:  [phone]   Specimen Collected: 05/01/25  2:37 PM Last Resulted: 05/03/25  4:05 PM        Lab Flowsheet          Order Details          View Encounter          Lab and Collection Details          Routing          Result History       View All Conversations on this Encounter       Result Care Coordination        Result Notes     Nadir Banuelos DO  5/5/2025 11:04  AM EDT Back to Top        Please call the patient regarding his abnormal result.  Schedule recheck regarding his lab       Patient Communication     Add Comments   Add Notifications  Back to Top          Encounters Related to Results    5/5/2025 Results Follow-Up  8/12/2024 Office Visit (Ordering Encounter)  5/1/2025 Appointment (Resulting Encounter) Back to Top     Ordered On 8/12/2024 12:34 PM    Ordering Provider Authorizing Provider Ordering User Ordering Department   Nadir Rjjacob, DO Nadir Banuelos, DO Nadir Banuelos, DO [facility] PRIMARY CARE   Family Medicine Family Medicine Family Medicine Family Medicine    [phone]     Imaging    [facility] Allergy Panel, Adult (Order: [id number]) - 5/1/2025  Lab Order Result Printout    [facility] Allergy Panel, Adult (Order #[id number]) on 5/1/25     External Results Report    Open External Results Report     Lab Component SmartPhrase Guide    [facility] Allergy Panel, Adult (Order #[id number]) on 5/1/25     Specimen Description:     5/2/2025 12:41 PM    Component Value Flag Ref Range Units Status   A.ALTERNATA 0.17  High  0.00 - 0.09 kUA/I Final   A.FUMIGATUS <0.10  0.00 - 0.09 kUA/I Final   [place] Grass 0.98  High  0.00 - 0.09 kUA/I Final   [place] 1.41  High  0.00 - 0.09 kUA/I Final   Cat Epithellium-Dander <0.10  0.00 - 0.09 kUA/I Final   C.HERBARUM <0.10  0.00 - 0.09 kUA/I Final   Cockroach 0.40  High  0.00 - 0.09 kUA/I Final   Common Silver Birch 10.80  High  0.00 - 0.09 kUA/I Final   [place] 0.61  High  0.00 - 0.09 kUA/I Final   D. farinae 4.30  High  0.00 - 0.09 kUA/I Final   D. pteronyssinus 4.33  High  0.00 - 0.09 kUA/I Final   Dog Dander 0.12  High  0.00 - 0.09 kUA/I Final   Elm IgE 0.91  High  0.00 - 0.09 kUA/I Final   Mountain [place] Tree 0.37  High  0.00 - 0.09 kUA/I Final   Mugwort 0.40  High  0.00 - 0.09 kUA/I Final   [place] Tree 0.38  High  0.00 - 0.09 kUA/I Final   Oak 7.46  High  0.00 -  0.09 kUA/I Final   P.CHRYSOGENUM 0.35  High  0.00 - 0.09 kUA/I Final   Rough Pigweed  IgE 0.44  High  0.00 - 0.09 kUA/I Final   Common Ragweed 2.71  High  0.00 - 0.09 kUA/I Final   Sheep Sorrel IgE 0.74  High  0.00 - 0.09 kUA/I Final   [place] Tree 0.68  High  0.00 - 0.09 kUA/I Final   [place]rendon Grass 1.47  High  0.00 - 0.09 kUA/I Final   [place] Tree 1.05  High  0.00 - 0.09 kUA/I Final   [place] Tree 0.66  High  0.00 - 0.09 kUA/I Final   IgE 120  High  0 - 113 kU/l Final   MOUSE URINE <0.10  0.00 - 0.09 kUA/I Final     All Reviewers List    Jazmine Banuelos MA on 5/6/2025  1:54 PM   Nadir Banuelos DO on 5/5/2025 11:04 AM     Lab Information    Lab   [facility] ([facility])   [address] [place] [address]  Tel: [phone]  : Modesto Mccollum MD-Lab Medical Director               Additional Information    Specimen ID Bill Type Client ID   [id number] Client             Specimen Date Taken Specimen Time Taken Specimen Received Date Specimen Received Time Result Date Result Time   May 1, 2025  2:37 PM May 1, 2025  7:49 PM May 2, 2025 12:41 PM     Routing History    Priority Sent On From To Message Type    5/5/2025 11:04 AM DO Jazmine Staley MA Result Notes    5/1/2025  3:08 PM Lab, Background User Nadir Banuelos DO Results     Results Routing Details    Order ID: [id number]   Result contact date: 5/1/25   Result status: Final result   Outcome: Routed using routing scheme   Routing Scheme Used:  SYSTEM DEFINITION RESULTS ROUTING [[id number]]   Routing Scheme Line: Default   Resulting User: Lab, Background User [LABBACKGROUND]   Routing Instant: Fri May 2, 2025 12:41 PM   Current Status: Routing Complete   Status History: Result contact created Thu May 1, 2025  2:59 PM     Routing Complete Thu May 1, 2025  2:59 PM     Routing started Fri May 2, 2025 12:41 PM     Routing Complete Fri May 2, 2025 12:41 PM   In Basket Sent: Message ID: [id number]  Recipients:         Nadir  DO Lakisha  [[id number]]              Responsible: Yes     Other Results from 5/1/2025     CBC and differential Final result 5/1/2025    Mycoplasma Pneumoniae AB, IgG/IgM Final result 5/1/2025

## 2025-05-22 RX ORDER — TRIAMCINOLONE ACETONIDE 1 MG/G
CREAM TOPICAL 2 TIMES DAILY
Qty: 80 G | Refills: 0 | Status: SHIPPED | OUTPATIENT
Start: 2025-05-22